# Patient Record
Sex: MALE | URBAN - METROPOLITAN AREA
[De-identification: names, ages, dates, MRNs, and addresses within clinical notes are randomized per-mention and may not be internally consistent; named-entity substitution may affect disease eponyms.]

---

## 2020-06-01 ENCOUNTER — APPOINTMENT (OUTPATIENT)
Dept: URBAN - METROPOLITAN AREA CLINIC 186 | Age: 35
Setting detail: DERMATOLOGY
End: 2020-06-01

## 2020-06-01 DIAGNOSIS — L40.8 OTHER PSORIASIS: ICD-10-CM

## 2020-06-01 DIAGNOSIS — L82.0 INFLAMED SEBORRHEIC KERATOSIS: ICD-10-CM

## 2020-06-01 DIAGNOSIS — L91.8 OTHER HYPERTROPHIC DISORDERS OF THE SKIN: ICD-10-CM

## 2020-06-01 PROCEDURE — OTHER DIAGNOSIS COMMENT: OTHER

## 2020-06-01 PROCEDURE — OTHER COUNSELING: OTHER

## 2020-06-01 PROCEDURE — 99202 OFFICE O/P NEW SF 15 MIN: CPT | Mod: 25

## 2020-06-01 PROCEDURE — OTHER PRESCRIPTION: OTHER

## 2020-06-01 PROCEDURE — 17110 DESTRUCT B9 LESION 1-14: CPT

## 2020-06-01 PROCEDURE — OTHER REASSURANCE: OTHER

## 2020-06-01 PROCEDURE — OTHER MIPS QUALITY: OTHER

## 2020-06-01 PROCEDURE — OTHER TREATMENT REGIMEN: OTHER

## 2020-06-01 PROCEDURE — OTHER LIQUID NITROGEN: OTHER

## 2020-06-01 RX ORDER — CLOCORTOLONE PIVALATE 1 MG/G
CREAM TOPICAL TWICE DAILY
Qty: 1 | Refills: 2 | Status: ERX | COMMUNITY
Start: 2020-06-01

## 2020-06-01 RX ORDER — KETOCONAZOLE 20 MG/ML
SHAMPOO TOPICAL
Qty: 1 | Refills: 11 | Status: ERX | COMMUNITY
Start: 2020-06-01

## 2020-06-01 RX ORDER — HALOBETASOL PROPIONATE 0.5 MG/G
AEROSOL, FOAM TOPICAL
Qty: 1 | Refills: 2 | Status: ERX | COMMUNITY
Start: 2020-06-01

## 2020-06-01 ASSESSMENT — LOCATION SIMPLE DESCRIPTION DERM
LOCATION SIMPLE: RIGHT UPPER BACK
LOCATION SIMPLE: CHEST
LOCATION SIMPLE: POSTERIOR SCALP

## 2020-06-01 ASSESSMENT — LOCATION DETAILED DESCRIPTION DERM
LOCATION DETAILED: POSTERIOR MID-PARIETAL SCALP
LOCATION DETAILED: RIGHT INFERIOR UPPER BACK
LOCATION DETAILED: RIGHT MEDIAL INFERIOR CHEST

## 2020-06-01 ASSESSMENT — LOCATION ZONE DERM
LOCATION ZONE: SCALP
LOCATION ZONE: TRUNK

## 2020-06-01 NOTE — PROCEDURE: TREATMENT REGIMEN
Plan: Discussed ketoconazole 3-4 times weekly , allow to sit on skin 5-10 min then rinse \\n\\nLexette twice daily to scalp and chest avoid face\\nCloderm twice daily may be used on face / groin
Initiate Treatment: Ketoconazole 2% shampoo\\nLexette twice daily (avoid face/groin)\\nCloderm twice daily to face or all affected areas
Plan: Liquid Nitrogen (LN2) versus no treatment
Detail Level: Zone

## 2020-06-01 NOTE — PROCEDURE: DIAGNOSIS COMMENT
Detail Level: Zone
Comment: Favor more seb derm than psoriasis. Discussed chronic and recurrent nature. Will start ketoconazole, lexette and cloderm